# Patient Record
Sex: MALE | Race: WHITE
[De-identification: names, ages, dates, MRNs, and addresses within clinical notes are randomized per-mention and may not be internally consistent; named-entity substitution may affect disease eponyms.]

---

## 2017-01-10 ENCOUNTER — HOSPITAL ENCOUNTER (OUTPATIENT)
Dept: HOSPITAL 53 - M LABDRAWC | Age: 77
End: 2017-01-10
Attending: NURSE PRACTITIONER
Payer: MEDICARE

## 2017-01-10 DIAGNOSIS — I48.0: Primary | ICD-10-CM

## 2017-01-10 LAB — INR PPP: 2.03

## 2017-02-13 ENCOUNTER — HOSPITAL ENCOUNTER (OUTPATIENT)
Dept: HOSPITAL 53 - M LABDRAWC | Age: 77
End: 2017-02-13
Attending: NURSE PRACTITIONER
Payer: MEDICARE

## 2017-02-13 DIAGNOSIS — I48.0: Primary | ICD-10-CM

## 2017-02-13 LAB — INR PPP: 2.22

## 2017-03-16 ENCOUNTER — HOSPITAL ENCOUNTER (OUTPATIENT)
Dept: HOSPITAL 53 - M LABDRAWC | Age: 77
End: 2017-03-16
Attending: PHYSICIAN ASSISTANT
Payer: MEDICARE

## 2017-03-16 DIAGNOSIS — I48.0: Primary | ICD-10-CM

## 2017-03-16 LAB — INR PPP: 2.42

## 2017-04-17 ENCOUNTER — HOSPITAL ENCOUNTER (OUTPATIENT)
Dept: HOSPITAL 53 - M LABDRAWC | Age: 77
End: 2017-04-17
Attending: NURSE PRACTITIONER
Payer: MEDICARE

## 2017-04-17 DIAGNOSIS — I48.0: Primary | ICD-10-CM

## 2017-04-17 LAB — INR PPP: 2.36

## 2017-05-17 ENCOUNTER — HOSPITAL ENCOUNTER (OUTPATIENT)
Dept: HOSPITAL 53 - M LABDRAWC | Age: 77
End: 2017-05-17
Attending: NURSE PRACTITIONER
Payer: MEDICARE

## 2017-05-17 DIAGNOSIS — I48.0: Primary | ICD-10-CM

## 2017-05-17 LAB — INR PPP: 2.73

## 2017-05-26 ENCOUNTER — HOSPITAL ENCOUNTER (OUTPATIENT)
Dept: HOSPITAL 53 - M LAB REF | Age: 77
End: 2017-05-26
Attending: INTERNAL MEDICINE
Payer: MEDICARE

## 2017-05-26 DIAGNOSIS — D64.9: Primary | ICD-10-CM

## 2017-05-26 LAB
FERRITIN SERPL-MCNC: 210 NG/ML (ref 26–388)
FOLATE SERPL-MCNC: 11 NG/ML
IRON SATN MFR SERPL: 35.3 % (ref 19.7–37.4)
TIBC SERPL-MCNC: 340 UG/DL (ref 250–450)
VIT B12 SERPL-MCNC: 398 PG/ML

## 2017-06-02 ENCOUNTER — HOSPITAL ENCOUNTER (OUTPATIENT)
Dept: HOSPITAL 53 - M CLY | Age: 77
End: 2017-06-02
Attending: PHYSICIAN ASSISTANT
Payer: MEDICARE

## 2017-06-02 DIAGNOSIS — I48.0: Primary | ICD-10-CM

## 2017-06-02 LAB
ALBUMIN SERPL BCG-MCNC: 3.4 GM/DL (ref 3.2–5.2)
ALBUMIN/GLOB SERPL: 0.76 {RATIO} (ref 1–1.93)
ALP SERPL-CCNC: 135 U/L (ref 45–117)
ALT SERPL W P-5'-P-CCNC: 23 U/L (ref 12–78)
AST SERPL-CCNC: 25 U/L (ref 15–37)
BILIRUB CONJ SERPL-MCNC: 0.2 MG/DL (ref 0–0.2)
BILIRUB SERPL-MCNC: 0.6 MG/DL (ref 0.2–1)
PROT SERPL-MCNC: 7.9 GM/DL (ref 6.4–8.2)

## 2017-06-02 PROCEDURE — 80076 HEPATIC FUNCTION PANEL: CPT

## 2017-06-02 PROCEDURE — 84443 ASSAY THYROID STIM HORMONE: CPT

## 2017-06-02 PROCEDURE — 81002 URINALYSIS NONAUTO W/O SCOPE: CPT

## 2017-06-02 PROCEDURE — 36415 COLL VENOUS BLD VENIPUNCTURE: CPT

## 2017-06-02 PROCEDURE — 71020: CPT

## 2017-06-02 NOTE — REP
Chest two views

 

HISTORY: Atrial fibrillation

 

Comparison: 02/22/2016

 

The lungs are clear.  The heart is upper limits of normal in size.  The pulmonary

vasculature is normal in appearance.  Degenerative change is present in the

thoracic spine.    A cardiac pacemaker is present.

 

IMPRESSION:  No acute disease.

## 2017-06-19 ENCOUNTER — HOSPITAL ENCOUNTER (OUTPATIENT)
Dept: HOSPITAL 53 - M LABDRAWC | Age: 77
End: 2017-06-19
Attending: PHYSICIAN ASSISTANT
Payer: MEDICARE

## 2017-06-19 DIAGNOSIS — I48.0: Primary | ICD-10-CM

## 2017-06-19 LAB — INR PPP: 2.86

## 2017-07-20 ENCOUNTER — HOSPITAL ENCOUNTER (OUTPATIENT)
Dept: HOSPITAL 53 - M LABDRAWC | Age: 77
End: 2017-07-20
Attending: NURSE PRACTITIONER
Payer: MEDICARE

## 2017-07-20 DIAGNOSIS — I48.0: Primary | ICD-10-CM

## 2017-07-20 LAB — INR PPP: 2.15

## 2017-08-18 ENCOUNTER — HOSPITAL ENCOUNTER (OUTPATIENT)
Dept: HOSPITAL 53 - M LAB REF | Age: 77
End: 2017-08-18
Attending: NURSE PRACTITIONER
Payer: MEDICARE

## 2017-08-18 DIAGNOSIS — I48.0: Primary | ICD-10-CM

## 2017-08-18 LAB — INR PPP: 2.4

## 2017-09-18 ENCOUNTER — HOSPITAL ENCOUNTER (OUTPATIENT)
Dept: HOSPITAL 53 - M LABDRAWC | Age: 77
End: 2017-09-18
Attending: PHYSICIAN ASSISTANT
Payer: MEDICARE

## 2017-09-18 DIAGNOSIS — I48.2: Primary | ICD-10-CM

## 2017-09-18 LAB — INR PPP: 2.47

## 2017-10-20 ENCOUNTER — HOSPITAL ENCOUNTER (OUTPATIENT)
Dept: HOSPITAL 53 - M LAB REF | Age: 77
End: 2017-10-20
Attending: NURSE PRACTITIONER
Payer: MEDICARE

## 2017-10-20 DIAGNOSIS — I48.2: Primary | ICD-10-CM

## 2017-10-20 LAB — INR PPP: 2.93

## 2017-11-21 ENCOUNTER — HOSPITAL ENCOUNTER (OUTPATIENT)
Dept: HOSPITAL 53 - M LAB REF | Age: 77
End: 2017-11-21
Attending: PHYSICIAN ASSISTANT
Payer: MEDICARE

## 2017-11-21 DIAGNOSIS — I48.2: Primary | ICD-10-CM

## 2017-11-21 LAB — INR PPP: 2.76

## 2017-12-26 ENCOUNTER — HOSPITAL ENCOUNTER (OUTPATIENT)
Dept: HOSPITAL 53 - M LABDRAWC | Age: 77
End: 2017-12-26
Attending: PHYSICIAN ASSISTANT
Payer: MEDICARE

## 2017-12-26 DIAGNOSIS — I48.2: Primary | ICD-10-CM

## 2017-12-26 LAB — INR PPP: 2.1

## 2018-01-29 ENCOUNTER — HOSPITAL ENCOUNTER (OUTPATIENT)
Dept: HOSPITAL 53 - M LAB REF | Age: 78
End: 2018-01-29
Attending: INTERNAL MEDICINE
Payer: MEDICARE

## 2018-01-29 DIAGNOSIS — I48.0: Primary | ICD-10-CM

## 2018-01-29 LAB
INR: 2.56
PROTHROMBIN TIME: 28.6 SECONDS (ref 12.4–14.5)

## 2018-01-29 PROCEDURE — 85610 PROTHROMBIN TIME: CPT

## 2018-02-28 ENCOUNTER — HOSPITAL ENCOUNTER (OUTPATIENT)
Dept: HOSPITAL 53 - M LABDRAWC | Age: 78
End: 2018-02-28
Attending: INTERNAL MEDICINE
Payer: MEDICARE

## 2018-02-28 DIAGNOSIS — I48.0: Primary | ICD-10-CM

## 2018-02-28 LAB
INR: 2.74
PROTHROMBIN TIME: 30.2 SECONDS (ref 12.4–14.5)

## 2018-02-28 PROCEDURE — 85610 PROTHROMBIN TIME: CPT

## 2018-04-03 ENCOUNTER — HOSPITAL ENCOUNTER (OUTPATIENT)
Dept: HOSPITAL 53 - M LABDRAWC | Age: 78
End: 2018-04-03
Attending: INTERNAL MEDICINE
Payer: MEDICARE

## 2018-04-03 DIAGNOSIS — I48.0: ICD-10-CM

## 2018-04-03 DIAGNOSIS — Z51.81: Primary | ICD-10-CM

## 2018-04-03 DIAGNOSIS — Z79.01: ICD-10-CM

## 2018-04-03 LAB
INR: 1.98
PROTHROMBIN TIME: 23.2 SECONDS (ref 12.4–14.5)

## 2018-04-03 PROCEDURE — 85610 PROTHROMBIN TIME: CPT

## 2018-04-19 ENCOUNTER — HOSPITAL ENCOUNTER (OUTPATIENT)
Dept: HOSPITAL 53 - M CLY | Age: 78
End: 2018-04-19
Attending: PHYSICIAN ASSISTANT
Payer: MEDICARE

## 2018-04-19 DIAGNOSIS — I48.0: Primary | ICD-10-CM

## 2018-04-19 PROCEDURE — 71046 X-RAY EXAM CHEST 2 VIEWS: CPT

## 2018-04-24 ENCOUNTER — HOSPITAL ENCOUNTER (OUTPATIENT)
Dept: HOSPITAL 53 - M LABDRAWC | Age: 78
End: 2018-04-24
Attending: PHYSICIAN ASSISTANT
Payer: MEDICARE

## 2018-04-24 DIAGNOSIS — I50.42: Primary | ICD-10-CM

## 2018-04-24 DIAGNOSIS — E78.00: ICD-10-CM

## 2018-04-24 DIAGNOSIS — I25.5: ICD-10-CM

## 2018-04-24 LAB
ALBUMIN/GLOBULIN RATIO: 0.73 (ref 1–1.93)
ALBUMIN: 3.2 GM/DL (ref 3.2–5.2)
ALKALINE PHOSPHATASE: 111 U/L (ref 45–117)
ALT SERPL W P-5'-P-CCNC: 23 U/L (ref 12–78)
ANION GAP: 5 MEQ/L (ref 8–16)
AST SERPL-CCNC: 25 U/L (ref 7–37)
BILIRUBIN,TOTAL: 0.6 MG/DL (ref 0.2–1)
BLOOD UREA NITROGEN: 27 MG/DL (ref 7–18)
CALCIUM LEVEL: 9.1 MG/DL (ref 8.8–10.2)
CARBON DIOXIDE LEVEL: 28 MEQ/L (ref 21–32)
CHLORIDE LEVEL: 107 MEQ/L (ref 98–107)
CHOLEST SERPL-MCNC: 105 MG/DL (ref ?–200)
CHOLESTEROL RISK RATIO: 2.14 (ref ?–5)
CREATININE FOR GFR: 2.11 MG/DL (ref 0.7–1.3)
GFR SERPL CREATININE-BSD FRML MDRD: 32.5 ML/MIN/{1.73_M2} (ref 42–?)
GLUCOSE, FASTING: 94 MG/DL (ref 70–100)
HDLC SERPL-MCNC: 49 MG/DL (ref 40–?)
INR: 2.21
LDL CHOLESTEROL: 40.6 MG/DL (ref ?–100)
MAGNESIUM LEVEL: 1.5 MG/DL (ref 1.8–2.4)
NONHDLC SERPL-MCNC: 56 MG/DL
POTASSIUM SERUM: 4.5 MEQ/L (ref 3.5–5.1)
PROTHROMBIN TIME: 25.4 SECONDS (ref 12.4–14.5)
SODIUM LEVEL: 140 MEQ/L (ref 136–145)
THYROID STIMULATING HORMONE: 1.22 UIU/ML (ref 0.36–3.74)
TOTAL PROTEIN: 7.6 GM/DL (ref 6.4–8.2)
TRIGLYCERIDES LEVEL: 77 MG/DL (ref ?–150)

## 2018-04-24 PROCEDURE — 83735 ASSAY OF MAGNESIUM: CPT

## 2018-05-22 ENCOUNTER — HOSPITAL ENCOUNTER (OUTPATIENT)
Dept: HOSPITAL 53 - M LABDRAWC | Age: 78
End: 2018-05-22
Attending: INTERNAL MEDICINE
Payer: MEDICARE

## 2018-05-22 DIAGNOSIS — I48.0: Primary | ICD-10-CM

## 2018-05-22 LAB
INR: 2.04
PROTHROMBIN TIME: 23.7 SECONDS (ref 12.4–14.5)

## 2018-05-22 PROCEDURE — 85610 PROTHROMBIN TIME: CPT

## 2018-07-02 ENCOUNTER — HOSPITAL ENCOUNTER (OUTPATIENT)
Dept: HOSPITAL 53 - M LABDRAWC | Age: 78
End: 2018-07-02
Attending: PHYSICIAN ASSISTANT
Payer: MEDICARE

## 2018-07-02 DIAGNOSIS — Z51.81: ICD-10-CM

## 2018-07-02 DIAGNOSIS — I48.0: Primary | ICD-10-CM

## 2018-07-02 DIAGNOSIS — Z79.01: ICD-10-CM

## 2018-07-02 LAB
INR: 2.87
PROTHROMBIN TIME: 30.7 SECONDS (ref 12.1–14.4)

## 2018-07-02 PROCEDURE — 85610 PROTHROMBIN TIME: CPT

## 2018-07-20 ENCOUNTER — HOSPITAL ENCOUNTER (OUTPATIENT)
Dept: HOSPITAL 53 - M LABDRAWC | Age: 78
End: 2018-07-20
Attending: PHYSICIAN ASSISTANT
Payer: MEDICARE

## 2018-07-20 DIAGNOSIS — I48.0: Primary | ICD-10-CM

## 2018-07-20 LAB
INR: 2.22
PROTHROMBIN TIME: 25 SECONDS (ref 12.1–14.4)

## 2018-07-20 PROCEDURE — 85610 PROTHROMBIN TIME: CPT
